# Patient Record
Sex: MALE | Race: WHITE | Employment: UNEMPLOYED | URBAN - NONMETROPOLITAN AREA
[De-identification: names, ages, dates, MRNs, and addresses within clinical notes are randomized per-mention and may not be internally consistent; named-entity substitution may affect disease eponyms.]

---

## 2021-04-05 ENCOUNTER — HOSPITAL ENCOUNTER (OUTPATIENT)
Dept: PHYSICAL THERAPY | Age: 41
Setting detail: THERAPIES SERIES
Discharge: HOME | End: 2021-04-05
Payer: COMMERCIAL

## 2021-04-05 PROCEDURE — 97162 PT EVAL MOD COMPLEX 30 MIN: CPT

## 2021-04-05 PROCEDURE — 97110 THERAPEUTIC EXERCISES: CPT

## 2021-04-05 NOTE — FLOWSHEET NOTE
walking that worsened. Patient had stents and fem bypass 5 times and continues with circulation problems. 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 Patient in rehab x 1 month at [facility] then home with wife, power chair at home from friend but no ramp in/out of house. 1st AKA prosthesis fit and received 1 month ago , has been wearing 4-6 hours per day but having problems as leg shrinking and bottoming out in socket with pain. Currently has 4 packets of 1, 3, 5 . Currently wearing full 5 ply socks on whole residual limb, cut off sock for proximal residual limb 5 ply total of 10 ply at top and 5 ply at bottom. Currently doing prone with towel roll under residual limb. Jose Saldivar 3 days ago when working on [employer] and sat back in chair and fell backward with chair. Does report not getting out of house since surgeries only 1 time in past month. Patient denies any issues with depression. Social/Functional History and Current Status:  Medications and Allergies have been reviewed and are listed on Medical History Questionnaire. Kendall Thomas lives with spouse in a multiple floor home with stairs and a handrail to enter. Currently living in old school that patient has turned into house. Patient lives on 3rd floor of house. 4 VALERIE to get into building with 1 HR, has 40 steps to get to 3rd floor apartment with B HR. Has long shower chair, shower head want, crutches, has RW, has manual w/c, has power chair from a friend but difficulty getting in/out of car so keeps on 3rd floor of apartment so doesn't use much. Has increased use /wearing of prosthesis to 4-6 hours per day, using wheelchair rest of day. Does use manual wheelchair in/out of MD appointments, to [facility].           Task Previous Current   ADLs  Independent Modified Independent uses shower chair in/out of shower, wife does help with putting long pants on /off   IADL's Independent Modified Independent Ambulation Independent Modified Independent walking limited by 100 feet from car to PT clinic   Transfers Independent Modified Independent uses handicap grab bars for bathroom, difficulty getting in/out of chair, stand pivot transfer to wheelchair to car   Recreation Independent [address] enjoys riding motorcycle , outdoor hiking, outside activity   [address] [facility]  Active    Work self employed restoration contractor- doing ladders , scaffolding, working on [employer], flashing on crosses, + lifting , currently not working, patient currently not sure if he wants to go on disability, not sure if he will be able to get back to this employment  currently not employed       OBJECTIVE:  Pain L residual limb \"end of stump\" 5-6/10  L buttock pain 10/10 \"spasms in butt\" with WB with prosthesis  LBP x 20 years since machine accident at [facility]- chiropractor x years but none x 10 years, % of the day, high 9/10, average 4/10  R LE calf pain      Palpation PT education on scar massage, cross friction massage 5 minutes, 2 x per day   Observation 18 inch incision with moderate scar tissue around incision   Posture fair        Range of Motion Hip: R hip flexion 60, abduction 15, extension   degrees. R hip flexion 70, abduction 15 degrees  Knee: R knee 0- 120 degrees with soreness at R knee flexion AROM. L knee NA  Ankle: R ankle DF 5, PF 50 degrees, L ankle NA   Strength Right Lower Extremity:  Impaired - hip and knee 4-/5, ankle 4/5  Left Lower Extremity:   Impaired - L hip 3+/5   Coordination [facility]   Sensation Impaired - L phantom pain in pain in arch of foot cramping, knife going through foot, knee twitching. Bed Mobility [facility]   Transfers [facility]   Ambulation Supervision  Distance: 100 feet  Surface: Level Tile  Device:Crutches  Gait Deviations:   Forward Flexed Posture, Slow Miguelina, Decreased Weight Shift Left, Decreased Trunk Rotation, Decreased Heel Strike on Left and Wide Base of Support   Balance Tinetti: 16/28               TREATMENT   Precautions:    Pain:     X in shaded column indicates activity completed today   Modalities Parameters/  Location  Notes                     Manual Therapy Time/Technique  Notes                     Exercise/Intervention   Notes   sidelying hip abduction R then L 10 5 x    Prone prop x 5 minutes   x    Prone SLR R then L R 5 x  L 10x 5 x    Prone pressup 10  x    Calf belt stretch 5 10 x                                                Specific Interventions Next Treatment: LE stretching, strengthening, gait training with prosthesis, stair training, balance, advanced gait    Activity/Treatment Tolerance:  []  Patient tolerated treatment well  [x]  Patient limited by fatigue  []  Patient limited by pain   []  Patient limited by medical complications  []  Other:     Assessment: PT for B LE stretching, strengthening, gait training to progress from crutches, to cane to no device and increased care/use of above the knee prosthesis. Body Structures/Functions/Activity Limitations: impaired balance, impaired ROM, impaired strength and abnormal gait  Prognosis: good    GOALS:  Patient Goal: to get back to walking without crutches    Short Term Goals:  Time Frame: 5 weeks  1. Patient to demonstrate increased AROM B hip extension to 5 degrees to allow patient to walk x 15 minutes with crutches without LOB  2. Increase strength B hip to 4/5 to allow patient to wear prosthesis x 8 to hours with no falls x 4 week  3. I with HEP as prescribed to allow patient to report able to walk in/out of therapy clinic and house with crutches and no assistance of falls with prosthesis    Long Term Goals:  Time Frame: 12 weeks  1.increase AROM B hip extension to 10 degrees , abduction 20 degrees, R knee 130 degrees to allow patient to get in/out of car and shower without assistance with prosthesis and no assistive device  2.  Increase strength B LE to 4+/5, abdominal strength to good to allow patient to tolerate walking x 30 minutes with prosthesis without assistive device with no falls  3. I with HEP as prescribed to allow patient to return getting on/off lawnmower , yard work with prosthesis x 20 minutes without rest break      Patient Education:   [x]  HEP/Education Completed: Plan of Care, Goals,  HEP of above exercises  []  Reviewed Prior HEP      []  Patient verbalized and/or demonstrated understanding of education provided. []  Patient unable to verbalize and/or demonstrate understanding of education provided. Will continue education. [x]  Barriers to learning: none    PLAN:  Treatment Recommendations: Strengthening, Range of Motion, Balance Training, Gait Training, Stair Training, Home Exercise Program and Patient Education    [x]  Plan of care initiated. Plan to see patient 2 times per week for 12 weeks to address the treatment planned outlined above.   []  Continue with current plan of care  []  Modify plan of care as follows:    []  Hold pending physician visit  []  Discharge    Time In 1300   Time Out 1400   Timed Code Minutes: 25 min   Total Treatment Time: 60 min       Electronically Signed by: Mazin Manrique PT [id number]

## 2021-04-14 ENCOUNTER — HOSPITAL ENCOUNTER (OUTPATIENT)
Dept: PHYSICAL THERAPY | Age: 41
Setting detail: THERAPIES SERIES
Discharge: HOME | End: 2021-04-14
Payer: COMMERCIAL

## 2021-04-14 PROCEDURE — 97110 THERAPEUTIC EXERCISES: CPT

## 2021-04-14 NOTE — PROGRESS NOTES
[address] [facility]  PHYSICAL THERAPY  [] EVALUATION  [x] DAILY NOTE (LAND) [] DAILY NOTE (AQUATIC ) [] PROGRESS NOTE [] DISCHARGE NOTE    [] [address] [facility]   [x] [address]    [] [facility]   [] Valerie Yanes    Date: 2021  Patient Name:  Khloe Parish  : 1980  MRN: [id number]  CSN: [id number]    Referring Practitioner Effie Gomez MD   Diagnosis LEFT AKA     Treatment Diagnosis Difficulty walking, weakness, decreased balance   Date of Evaluation 21   Additional Pertinent History Poor circulation B LE, history of chronic LBP - specialist recommended back surgery 15 years ago and no MD's since      Functional Outcome Measure Used Tinetti balance test   Functional Outcome Score  (21)       Insurance: Primary: Payor: Jairon Mejia /  /  / ,   Secondary:    Authorization Information: Precert needed. Pays at 100%, modalities covered except MHP/CP not covered. 30 visits per calendar year. Visit # 2, 2/10 for progress note   Visits Allowed: 1   Recertification Date:    Physician Follow-Up: F/u with Dr. Mick newton MD in , unsure of date. End of May f/u with [facility] surgeon for testing for fempop bypass this summer. Physician Orders: PT   History of Present Illness:      SUBJECTIVE: Pt reports 3-4/10 at end of stump, back pain 6-7/10. Pt reports feeling like he has a better understanding of putting on socks and prosthesis.     OBJECTIVE:    TREATMENT   Precautions:    Pain: 3-4/10 end of stump    X in shaded column indicates activity completed today   Modalities Parameters/  Location  Notes                     Manual Therapy Time/Technique  Notes                     Exercise/Intervention   Notes   sidelying hip abduction R then L 10 5 x L 2x5 hold 5sec, VCs for L LE    Prone prop   4 min x    Prone SLR R then L R 10 x  L 10x 5 x    Prone pressup 10 3  unable to tolerate today d/t back pn   Calf belt stretch 5 10 x    SKTC 3 20sec x           Standing weight shift fwd/bwd, S/S, 30sec ea  x CGA, no UE support   Standing balance WBOS/NBOS 1 min  x CGA, no UE support   2 inch step taps       Sit to stand x5ea  x Two bouts at different levels     Specific Interventions Next Treatment: LE stretching, strengthening, gait training with prosthesis, stair training, balance, advanced gait    Activity/Treatment Tolerance:  []  Patient tolerated treatment well  [x]  Patient limited by fatigue  [x]  Patient limited by pain   []  Patient limited by medical complications  []  Other:     Assessment: Pt limited with activity during session d/t back pain. Completed standing balance exs with minimal difficulty noted. Pt scheduled x2 visits next week. GOALS:  Patient Goal: to get back to walking without crutches    Short Term Goals:  Time Frame: 5 weeks  1. Patient to demonstrate increased AROM B hip extension to 5 degrees to allow patient to walk x 15 minutes with crutches without LOB  2. Increase strength B hip to 4/5 to allow patient to wear prosthesis x 8 to hours with no falls x 4 week  3. I with HEP as prescribed to allow patient to report able to walk in/out of therapy clinic and house with crutches and no assistance of falls with prosthesis    Long Term Goals:  Time Frame: 12 weeks  1.increase AROM B hip extension to 10 degrees , abduction 20 degrees, R knee 130 degrees to allow patient to get in/out of car and shower without assistance with prosthesis and no assistive device  2. Increase strength B LE to 4+/5, abdominal strength to good to allow patient to tolerate walking x 30 minutes with prosthesis without assistive device with no falls  3.  I with HEP as prescribed to allow patient to return getting on/off lawnmower , yard work with prosthesis x 20 minutes without rest break      Patient Education:   []  HEP/Education Completed: Plan of Care, Goals,  HEP of above exercises  [x]  Reviewed Prior HEP      [] Patient verbalized and/or demonstrated understanding of education provided. []  Patient unable to verbalize and/or demonstrate understanding of education provided. Will continue education. [x]  Barriers to learning: none    PLAN:  Treatment Recommendations: Strengthening, Range of Motion, Balance Training, Gait Training, Stair Training, Home Exercise Program and Patient Education    []  Plan of care initiated. Plan to see patient 2 times per week for 12 weeks to address the treatment planned outlined above.   [x]  Continue with current plan of care  []  Modify plan of care as follows:    []  Hold pending physician visit  []  Discharge    Time In 1105   Time Out 1148   Timed Code Minutes: 43 min   Total Treatment Time: 43 min       Electronically Signed by: Chris Bahena PTA [id number]

## 2021-04-16 ENCOUNTER — HOSPITAL ENCOUNTER (OUTPATIENT)
Dept: PHYSICAL THERAPY | Age: 41
Setting detail: THERAPIES SERIES
Discharge: HOME | End: 2021-04-16
Payer: COMMERCIAL

## 2021-04-16 PROCEDURE — 97110 THERAPEUTIC EXERCISES: CPT

## 2021-04-16 PROCEDURE — 97116 GAIT TRAINING THERAPY: CPT

## 2021-04-16 NOTE — PROGRESS NOTES
tolerate today d/t back pn   Supine R knee bent , relaxing back and getting L thigh flat on bed 2 minutes  x    Abdominal bracing 10 5 x    Quad set L 10 5 x Attempted with abdominal bracing    Abdominal bracing with UE flexion off bed 6 inches, alternating arms 10  x    Abdominal bracing with bolster under both legs SAQ on R 10  x    Gait training 2 x 100 feet with cues to bring crutches in toward body more, have both crutches equal, bring left leg forward, heel strike and push back of L thigh into socket to straighten knee as WB in socket   x    pregait exercises- at steps- B HR, toes even, shift to right, shift to left, push left thigh back into socket to straighten knee and bring R foot onto 6 inch step   x Cues to stand erect and lessen pressure on hands   Calf belt stretch 5 10     SKTC 3 20sec     Up and down 4 , 6 inch steps 2 x B HR, CGA x 1 non-reciprocal gait   x Patient demonstrated going down steps with no pressure on prosthesis, taking 3 steps at a time and jumping down   Standing weight shift fwd/bwd, S/S, 30sec ea  x CGA, no UE support   Standing balance WBOS/NBOS 1 min   CGA, no UE support   Sit to stand x5ea  x Two bouts at different levels     Specific Interventions Next Treatment: LE stretching, strengthening, gait training with prosthesis, stair training, balance, advanced gait    Activity/Treatment Tolerance:  []  Patient tolerated treatment well  [x]  Patient limited by fatigue  [x]  Patient limited by pain   []  Patient limited by medical complications  []  Other:     Assessment: long history of LBP with poor core strength . DLSP started, education on wearing of prosthesis 2, 3 hour sessions per day to reduce stress on back. Patient very anxious to get to walking without device and education on need to perfect walking with crutches prior to walking with cane or no device.   Poor knee extension at midstance and buckling of knee with walking, poor use of crutches corrected, poor gait on steps corrected, encouraged patient to think of self at start of marathon, patient wants to be at end but there are a lot of things to correct and improve and just starting with new prosthesis      GOALS:  Patient Goal: to get back to walking without crutches    Short Term Goals:  Time Frame: 5 weeks  1. Patient to demonstrate increased AROM B hip extension to 5 degrees to allow patient to walk x 15 minutes with crutches without LOB  2. Increase strength B hip to 4/5 to allow patient to wear prosthesis x 8 to hours with no falls x 4 week  3. I with HEP as prescribed to allow patient to report able to walk in/out of therapy clinic and house with crutches and no assistance of falls with prosthesis    Long Term Goals:  Time Frame: 12 weeks  1.increase AROM B hip extension to 10 degrees , abduction 20 degrees, R knee 130 degrees to allow patient to get in/out of car and shower without assistance with prosthesis and no assistive device  2. Increase strength B LE to 4+/5, abdominal strength to good to allow patient to tolerate walking x 30 minutes with prosthesis without assistive device with no falls  3. I with HEP as prescribed to allow patient to return getting on/off lawnmower , yard work with prosthesis x 20 minutes without rest break      Patient Education:   [x]  HEP/Education Completed: handout with [facility] code: [place]     [x]  Reviewed Prior HEP      []  Patient verbalized and/or demonstrated understanding of education provided. []  Patient unable to verbalize and/or demonstrate understanding of education provided. Will continue education. [x]  Barriers to learning: none    PLAN:  Treatment Recommendations: Strengthening, Range of Motion, Balance Training, Gait Training, Stair Training, Home Exercise Program and Patient Education    []  Plan of care initiated. Plan to see patient 2 times per week for 12 weeks to address the treatment planned outlined above.   [x]  Continue with current plan of care  [] Modify plan of care as follows:    []  Hold pending physician visit  []  Discharge    Time In 1245   Time Out 1330   Timed Code Minutes: 45 min   Total Treatment Time: 45 min       Electronically Signed by: Bruno Rubio PT [id number]

## 2021-04-21 ENCOUNTER — HOSPITAL ENCOUNTER (OUTPATIENT)
Dept: PHYSICAL THERAPY | Age: 41
Setting detail: THERAPIES SERIES
Discharge: HOME | End: 2021-04-21
Payer: COMMERCIAL

## 2021-04-21 PROCEDURE — 97110 THERAPEUTIC EXERCISES: CPT

## 2021-04-21 NOTE — PROGRESS NOTES
[address] [facility]  PHYSICAL THERAPY  [] EVALUATION  [x] DAILY NOTE (LAND) [] DAILY NOTE (AQUATIC ) [] PROGRESS NOTE [] DISCHARGE NOTE    [] [address] [facility]   [x] [address]    [] [facility]   [] Arianna Weber    Date: 2021  Patient Name:  Michele Hutson  : 1980  MRN: [id number]  CSN: [id number]    Referring Practitioner Mely Lynn MD   Diagnosis LEFT AKA     Treatment Diagnosis Difficulty walking, weakness, decreased balance   Date of Evaluation 21   Additional Pertinent History Poor circulation B LE, history of chronic LBP - specialist recommended back surgery 15 years ago and no MD's since      Functional Outcome Measure Used Tinetti balance test   Functional Outcome Score  (21)       Insurance: Primary: Payor: Jetty Councilman /  /  / ,   Secondary:    Authorization Information: Precert needed. Pays at 100%, modalities covered except MHP/CP not covered. 30 visits per calendar year. Visit # 4, 4/10 for progress note   Visits Allowed: 1   Recertification Date: 6796   Physician Follow-Up: F/u with Dr. Ponce Iglesias family MD in , unsure of date. End of May f/u with [facility] surgeon for testing for fempop bypass this summer. Physician Orders: PT   History of Present Illness:      SUBJECTIVE: Pt ambulating with improved technique since last session. Reports that LBP is 6/10. Reports wearing prosthesis 3 hours on and 3 hours off x2 for a total of 6 hours a day. Patient reports that the prosthesis is fitting better, 13 at top and 5 at the bottom with PT instructing pt to call when top gets to 15. Reports he is getting an offset knee added to his prosthesis to assist with gait mechanics. Compliant with HEP x2 a day.     OBJECTIVE:    TREATMENT   Precautions:    Pain: 6-7/10 LBP, phantom foot /calfpain 3/10    X in shaded column indicates activity completed today   Modalities Parameters/  Location  Notes Manual Therapy Time/Technique  Notes                     Exercise/Intervention   Notes   sidelying hip abduction R then L 10 5 x L 2x5 hold 5sec, VCs for L LE    Prone prop   4 min x    Prone SLR R then L R 10 x  L 10x 5  5 x    Prone pressup 10 3  unable to tolerate today d/t back pn   Supine R knee bent , relaxing back and getting L thigh flat on bed 2 minutes  x Pt able to keep RLE flat on table   Abdominal bracing 10 5 x    Quad set L, R knee bent 15 5 x with abdominal bracing    Abdominal bracing with UE flexion off bed 6 inches, alternating arms 10 5 x    Abdominal bracing with bolster under both legs SAQ on R 15 5 x    Abdominal bracing with mini bridge- use of bolster   x Attempted, unable to d/t pain in back   Gait training 2 x 100 feet with cues to bring crutches in toward body more, have both crutches equal, bring left leg forward, heel strike and push back of L thigh into socket to straighten knee as WB in socket   x In clinic, improved heel strike and knee extension noted   pregait exercises- at steps- B HR, toes even, shift to right, shift to left, push left thigh back into socket to straighten knee and bring R foot onto 6 inch step  2x5 step taps ea LE x Cues to stand erect and lessen pressure on hands   Calf belt stretch 5 10     SKTC 3 20sec     Up and down 4 , 6 inch steps 2 x B HR, CGA x 1 non-reciprocal gait    Patient demonstrated going down steps with no pressure on prosthesis, taking 3 steps at a time and jumping down   Standing weight shift fwd/bwd, S/S, 15sec ea  x CGA, no UE support   Standing balance WBOS/NBOS [id number] ea  x CGA, no UE support   Sit to stand x5ea   Two bouts at different levels     Specific Interventions Next Treatment: LE stretching, strengthening, gait training with prosthesis, stair training, balance, advanced gait    Activity/Treatment Tolerance:  []  Patient tolerated treatment well  []  Patient limited by fatigue  [x]  Patient limited by pain   [] Patient limited by medical complications  []  Other:     Assessment: Pt tolerating session fairly well, LBP continues to limit pt tolerance with standing upright, along with other strengthening exs Noted improved gait and locking out R knee. GOALS:  Patient Goal: to get back to walking without crutches    Short Term Goals:  Time Frame: 5 weeks  1. Patient to demonstrate increased AROM B hip extension to 5 degrees to allow patient to walk x 15 minutes with crutches without LOB  2. Increase strength B hip to 4/5 to allow patient to wear prosthesis x 8 to hours with no falls x 4 week  3. I with HEP as prescribed to allow patient to report able to walk in/out of therapy clinic and house with crutches and no assistance of falls with prosthesis    Long Term Goals:  Time Frame: 12 weeks  1.increase AROM B hip extension to 10 degrees , abduction 20 degrees, R knee 130 degrees to allow patient to get in/out of car and shower without assistance with prosthesis and no assistive device  2. Increase strength B LE to 4+/5, abdominal strength to good to allow patient to tolerate walking x 30 minutes with prosthesis without assistive device with no falls  3. I with HEP as prescribed to allow patient to return getting on/off lawnmower , yard work with prosthesis x 20 minutes without rest break      Patient Education:   [x]  HEP/Education Completed: handout with [facility] code: Maurice [place]     [x]  Reviewed Prior HEP      []  Patient verbalized and/or demonstrated understanding of education provided. []  Patient unable to verbalize and/or demonstrate understanding of education provided. Will continue education. [x]  Barriers to learning: none    PLAN:  Treatment Recommendations: Strengthening, Range of Motion, Balance Training, Gait Training, Stair Training, Home Exercise Program and Patient Education    []  Plan of care initiated.   Plan to see patient 2 times per week for 12 weeks to address the treatment planned outlined above.  [x]  Continue with current plan of care  []  Modify plan of care as follows:    []  Hold pending physician visit  []  Discharge    Time In 1100   Time Out 1145   Timed Code Minutes: 45 min   Total Treatment Time: 45 min       Electronically Signed by: Milton Power PTA [id number]

## 2021-04-23 ENCOUNTER — HOSPITAL ENCOUNTER (OUTPATIENT)
Dept: PHYSICAL THERAPY | Age: 41
Setting detail: THERAPIES SERIES
Discharge: HOME | End: 2021-04-23
Payer: COMMERCIAL

## 2021-04-23 PROCEDURE — 97116 GAIT TRAINING THERAPY: CPT

## 2021-04-23 PROCEDURE — 97110 THERAPEUTIC EXERCISES: CPT

## 2021-04-23 NOTE — PROGRESS NOTES
Pain: 7/10 LBP, phantom foot /calfpain 3/10    X in shaded column indicates activity completed today   Modalities Parameters/  Location  Notes                     Manual Therapy Time/Technique  Notes                     Exercise/Intervention   Notes   sidelying hip extension R then L 10 5 x      Prone prop   4 min x    Prone SLR R then L R 10 x  L 10x 5  5  Unable due to LBP   Prone pressup 10  x    Supine R knee bent , relaxing back and getting L thigh flat on bed 3 minutes  x Pt able to keep RLE flat on table   Abdominal bracing 15 5 x    Quad set L, R knee bent 15 5 x with abdominal bracing    Abdominal bracing with UE flexion off bed 6 inches, alternating arms 10 5 x    Abdominal bracing with bolster under both legs SAQ on R 15 5 x    Abdominal bracing with mini bridge- use of bolster    Attempted, unable to d/t pain in back   Gait training 2 x 100 feet with cues to bring crutches in toward body more, have both crutches equal, bring left leg forward, heel strike and push back of L thigh into socket to straighten knee as WB in socket   x In clinic, improved heel strike and knee extension noted   pregait exercises- at steps- B HR, toes even, shift to right, shift to left, push left thigh back into socket to straighten knee and bring R foot onto 6 inch step  2x5 step taps ea LE x Cues to stand erect and lessen pressure on hands   Calf belt stretch 5 10     SKTC 3 20sec     Up and down 4 , 6 inch steps 2 x B HR, CGA x 1 non-reciprocal gait    Patient demonstrated going down steps with no pressure on prosthesis, taking 3 steps at a time and jumping down   Standing weight shift fwd/bwd, S/S, 15sec ea  x CGA, no UE support   Standing balance WBOS/NBOS [id number] ea  x CGA, no UE support   Sit to stand x5ea   Two bouts at different levels     Specific Interventions Next Treatment: LE stretching, strengthening, gait training with prosthesis, stair training, balance, advanced gait    Activity/Treatment Tolerance:  [] Training, Home Exercise Program and Patient Education    []  Plan of care initiated. Plan to see patient 2 times per week for 12 weeks to address the treatment planned outlined above.   [x]  Continue with current plan of care  []  Modify plan of care as follows:    []  Hold pending physician visit  []  Discharge    Time In 1100   Time Out 1145   Timed Code Minutes: 45 min   Total Treatment Time: 45 min       Electronically Signed by: Andrew Bradley PT [id number]

## 2021-04-28 ENCOUNTER — APPOINTMENT (OUTPATIENT)
Dept: PHYSICAL THERAPY | Age: 41
End: 2021-04-28
Payer: COMMERCIAL

## 2021-04-30 ENCOUNTER — HOSPITAL ENCOUNTER (OUTPATIENT)
Dept: PHYSICAL THERAPY | Age: 41
Setting detail: THERAPIES SERIES
Discharge: HOME | End: 2021-04-30
Payer: COMMERCIAL

## 2021-04-30 PROCEDURE — 97116 GAIT TRAINING THERAPY: CPT

## 2021-04-30 PROCEDURE — 97110 THERAPEUTIC EXERCISES: CPT

## 2021-04-30 NOTE — PROGRESS NOTES
[address] [facility]  PHYSICAL THERAPY  [x] DAILY NOTE (LAND) [] DAILY NOTE (AQUATIC ) [] PROGRESS NOTE [] DISCHARGE NOTE    [] [address] [facility]   [x] Martha [address]    [] [facility]   [] Rodney Castañeda    Date: 2021  Patient Name:  Nelson Ricardo  : 1980  MRN: [id number]  CSN: [id number]    Referring Practitioner Kaycee Acosta MD   Diagnosis LEFT AKA     Treatment Diagnosis Difficulty walking, weakness, decreased balance   Date of Evaluation 21   Additional Pertinent History Poor circulation B LE, history of chronic LBP - specialist recommended back surgery 15 years ago and no MD's since      Functional Outcome Measure Used Tinetti balance test   Functional Outcome Score  (21)       Insurance: Primary: Payor: Afsaneh Mishran /  /  / ,   Secondary:    Authorization Information: Precert needed. Pays at 100%, modalities covered except MHP/CP not covered. 30 visits per calendar year. Visit # 5, 5/10 for progress note   Visits Allowed: 1   Recertification Date:    Physician Follow-Up: F/u with Dr. Pat newton MD in , unsure of date. End of May f/u with [facility] surgeon for testing for fempop bypass this summer. Physician Orders: PT   History of Present Illness:      SUBJECTIVE: Patient reporting low back pain 4/10 and stump pain 4/10. Patient reporting got knee offset by 15 degrees and reporting that getting it offset has helped his back. Patient reporting he is also getting a new socket.   OBJECTIVE:    TREATMENT   Precautions: L AKA, fall risk   Pain: 4/10 LBP, stump pain 4/10    X in shaded column indicates activity completed today   Modalities Parameters/  Location  Notes         Manual Therapy Time/Technique  Notes         Exercise/Intervention   Notes   side lying hip extension R then L 10 5 x      Prone prop   4 min x    Prone SLR R then L R 10 x  L 10x 5  5 x    Prone press up 10  x    Supine R knee but denies increase in stump pain. No other complains reported at end of session. GOALS:  Patient Goal: to get back to walking without crutches    Short Term Goals:  Time Frame: 5 weeks  1. Patient to demonstrate increased AROM B hip extension to 5 degrees to allow patient to walk x 15 minutes with crutches without LOB  2. Increase strength B hip to 4/5 to allow patient to wear prosthesis x 8 to hours with no falls x 4 week  3. I with HEP as prescribed to allow patient to report able to walk in/out of therapy clinic and house with crutches and no assistance of falls with prosthesis    Long Term Goals:  Time Frame: 12 weeks  1.increase AROM B hip extension to 10 degrees , abduction 20 degrees, R knee 130 degrees to allow patient to get in/out of car and shower without assistance with prosthesis and no assistive device  2. Increase strength B LE to 4+/5, abdominal strength to good to allow patient to tolerate walking x 30 minutes with prosthesis without assistive device with no falls  3. I with HEP as prescribed to allow patient to return getting on/off lawnmower , yard work with prosthesis x 20 minutes without rest break      Patient Education:   [x]  HEP/Education Completed:  Increased wearing of prosthesis to 4-5 hours at a time, 1 hour in between. [x]  Reviewed Prior HEP      []  Patient verbalized and/or demonstrated understanding of education provided. []  Patient unable to verbalize and/or demonstrate understanding of education provided. Will continue education. [x]  Barriers to learning: none    PLAN:2 times per week for 12 weeks.   Treatment Recommendations: Strengthening, Range of Motion, Balance Training, Gait Training, Stair Training, Home Exercise Program and Patient Education    [x]  Continue with current plan of care  []  Modify plan of care as follows:    []  Hold pending physician visit  []  Discharge    Time In 1101   Time Out 1143   Timed Code Minutes: 42 min   Total Treatment Time: 42 min Electronically Signed by: Heidi Blancas

## 2021-05-05 ENCOUNTER — HOSPITAL ENCOUNTER (OUTPATIENT)
Dept: PHYSICAL THERAPY | Age: 41
Setting detail: THERAPIES SERIES
Discharge: HOME | End: 2021-05-05
Payer: COMMERCIAL

## 2021-05-05 PROCEDURE — 97116 GAIT TRAINING THERAPY: CPT

## 2021-05-05 PROCEDURE — 97110 THERAPEUTIC EXERCISES: CPT

## 2021-05-05 NOTE — PROGRESS NOTES
[address] [facility]  PHYSICAL THERAPY  [x] DAILY NOTE (LAND) [] DAILY NOTE (AQUATIC ) [] PROGRESS NOTE [] DISCHARGE NOTE    [] [address] [facility]   [x] Martha     [] [facility]   [] Parviz Maxwell    Date: 2021  Patient Name:  Angie Lockett  : 1980  MRN: [id number]  CSN: [id number]    Referring Practitioner Tu Santos MD   Diagnosis LEFT AKA     Treatment Diagnosis Difficulty walking, weakness, decreased balance   Date of Evaluation 21   Additional Pertinent History Poor circulation B LE, history of chronic LBP - specialist recommended back surgery 15 years ago and no MD's since      Functional Outcome Measure Used Tinetti balance test   Functional Outcome Score  (21)       Insurance: Primary: Payor: Harpal Bejarano /  /  / ,   Secondary:    Authorization Information: Precert needed. Pays at 100%, modalities covered except MHP/CP not covered. 30 visits per calendar year. Visit # 7, 7/10 for progress note   Visits Allowed: 1   Recertification Date:    Physician Follow-Up: F/u with Dr. Raoul newton MD in , unsure of date. End of May f/u with [facility] surgeon for testing for fempop bypass this summer. Physician Orders: PT   History of Present Illness:      SUBJECTIVE:  Patient reports wearing prosthesis 15 hours each day Saturday, . Has small \"rub\" on front of groin on L, , small band aid over sore.     OBJECTIVE:    TREATMENT   Precautions: L AKA, fall risk   Pain: 4/10 LBP, stump pain 4/10    X in shaded column indicates activity completed today   Modalities Parameters/  Location  Notes         Manual Therapy Time/Technique  Notes         Exercise/Intervention   Notes   side lying hip extension R then L 10 5 x      Prone prop   4 min x    Prone SLR R then L R 15 x  L 15x 5  5 x    Prone press up 10  x    Supine R knee bent , relaxing back and getting L leg hang/drop off bed  3 minutes  x Pt able to keep RLE flat on table   Abdominal bracing 20 5 x    Quad set L, R knee bent 15 5 x with abdominal bracing    Abdominal bracing with UE flexion off bed 6 inches, alternating arms 15 5 x    Abdominal bracing with bolster under both legs SAQ on R 15 5 x    Abdominal bracing with mini bridge- use of bolster    Attempted, unable to d/t pain in back   Gait training 4 x 100 feet with cues to bring crutches in toward body more, have both crutches equal, bring left leg forward, heel strike and push back of L thigh into socket to straighten knee as WB in socket   x In clinic, improved heel strike and knee extension noted   pregait exercises- at steps- B HR, toes even, shift to right, shift to left, push left thigh back into socket to straighten knee and bring R foot onto 6 inch step  2x5 step taps ea LE  Cues to stand erect and lessen pressure on hands   Calf belt stretch 5 10     SKTC 3 20sec     Up and down 4 , 6 inch steps 2 x B HR, CGA x 1 non-reciprocal gait    Patient demonstrated going down steps with no pressure on prosthesis, taking 3 steps at a time and jumping down   Standing weight shift fwd/bwd, S/S, 20 ea  x CGA, no UE support   Standing balance WBOS/NBOS [id number] ea  x CGA, no UE support   Small step stance  20 sec x 2 each  x CGA, no UE support   Sit to stand x5ea   Two bouts at different levels     Specific Interventions Next Treatment: LE stretching, strengthening, gait training with prosthesis, stair training, balance, advanced gait    Activity/Treatment Tolerance:  []  Patient tolerated treatment well  []  Patient limited by fatigue  [x]  Patient limited by pain   []  Patient limited by medical complications  []  Other:     Assessment: PT notes 1 cm inner thigh open area where skin had rubbed off but not an open wound. Instructed to wear prosthesis only 3 x , 2 hours max, monitor size of sore and if increased size or drainage from wound , need to stop wearing prosthesis all together.   Poor fit of prosthesis with 16 ply socks, awaiting new socket  GOALS:  Patient Goal: to get back to walking without crutches    Short Term Goals:  Time Frame: 5 weeks  1. Patient to demonstrate increased AROM B hip extension to 5 degrees to allow patient to walk x 15 minutes with crutches without LOB  2. Increase strength B hip to 4/5 to allow patient to wear prosthesis x 8 to hours with no falls x 4 week  3. I with HEP as prescribed to allow patient to report able to walk in/out of therapy clinic and house with crutches and no assistance of falls with prosthesis    Long Term Goals:  Time Frame: 12 weeks  1.increase AROM B hip extension to 10 degrees , abduction 20 degrees, R knee 130 degrees to allow patient to get in/out of car and shower without assistance with prosthesis and no assistive device  2. Increase strength B LE to 4+/5, abdominal strength to good to allow patient to tolerate walking x 30 minutes with prosthesis without assistive device with no falls  3. I with HEP as prescribed to allow patient to return getting on/off lawnmower , yard work with prosthesis x 20 minutes without rest break      Patient Education:   [x]  HEP/Education Completed:  Decreased . []  Reviewed Prior HEP      []  Patient verbalized and/or demonstrated understanding of education provided. []  Patient unable to verbalize and/or demonstrate understanding of education provided. Will continue education. [x]  Barriers to learning: none    PLAN:2 times per week for 12 weeks.   Treatment Recommendations: Strengthening, Range of Motion, Balance Training, Gait Training, Stair Training, Home Exercise Program and Patient Education    [x]  Continue with current plan of care  []  Modify plan of care as follows:    []  Hold pending physician visit  []  Discharge    Time In 1100   Time Out 1145   Timed Code Minutes: 45 min   Total Treatment Time: 45 min       Electronically Signed by: Carlos Naqvi

## 2021-05-07 ENCOUNTER — HOSPITAL ENCOUNTER (OUTPATIENT)
Dept: PHYSICAL THERAPY | Age: 41
Setting detail: THERAPIES SERIES
Discharge: HOME | End: 2021-05-07
Payer: COMMERCIAL

## 2021-05-07 PROCEDURE — 97110 THERAPEUTIC EXERCISES: CPT

## 2021-05-07 PROCEDURE — 97116 GAIT TRAINING THERAPY: CPT

## 2021-05-07 NOTE — PROGRESS NOTES
[address] [facility]  PHYSICAL THERAPY  [x] DAILY NOTE (LAND) [] DAILY NOTE (AQUATIC ) [] PROGRESS NOTE [] DISCHARGE NOTE    [] [address] [facility]   [x] [address]    [] [facility]   [] Gus Bedoya    Date: 2021  Patient Name:  Rennis Peabody  : 1980  MRN: [id number]  CSN: [id number]    Referring Practitioner Koki Reagan MD   Diagnosis LEFT AKA     Treatment Diagnosis Difficulty walking, weakness, decreased balance   Date of Evaluation 21   Additional Pertinent History Poor circulation B LE, history of chronic LBP - specialist recommended back surgery 15 years ago and no MD's since      Functional Outcome Measure Used Tinetti balance test   Functional Outcome Score  (21)       Insurance: Primary: Payor: Dorice Favre /  /  / ,   Secondary:    Authorization Information: Precert needed. Pays at 100%, modalities covered except MHP/CP not covered. 30 visits per calendar year. Visit # 8, 8/10 for progress note   Visits Allowed: 1   Recertification Date:    Physician Follow-Up: F/u with Dr. Caroline newton MD in , unsure of date. End of May f/u with [facility] surgeon for testing for fempop bypass this summer. Physician Orders: PT   History of Present Illness:      SUBJECTIVE:  Patient reports sore is no worse today and reporting no new complains at this time.     OBJECTIVE:    TREATMENT   Precautions: L AKA, fall risk   Pain: 4/10 LBP, stump pain 4/10    X in shaded column indicates activity completed today   Modalities Parameters/  Location  Notes         Manual Therapy Time/Technique  Notes         Exercise/Intervention   Notes   side lying hip extension R then L 15 5 x      Prone prop   4 min x    Prone SLR R then L R 15 x  L 15x 5  5 x    Prone press up 10  x    Supine R knee bent , relaxing back and getting L leg hang/drop off bed  3 minutes  x Pt able to keep RLE flat on table   Abdominal bracing 20 5 x    Quad set L, R knee bent 15 5 x with abdominal bracing    Abdominal bracing with UE flexion off bed 6 inches, alternating arms 20 5 x    Abdominal bracing with bolster under both legs SAQ on R 15 5 x    Abdominal bracing with mini bridge- use of bolster    Attempted, unable to d/t pain in back   Gait training 4 x 100 feet with cues to bring crutches in toward body more, have both crutches equal, bring left leg forward, heel strike and push back of L thigh into socket to straighten knee as WB in socket   x In clinic, improved heel strike and knee extension noted   pregait exercises- at steps- B HR, toes even, shift to right, shift to left, push left thigh back into socket to straighten knee and bring R foot onto 6 inch step  2x5 step taps ea LE  Cues to stand erect and lessen pressure on hands   Calf belt stretch 5 10     SKTC 3 20sec     Up and down 4 , 6 inch steps 2 x B HR, CGA x 1 non-reciprocal gait    Patient demonstrated going down steps with no pressure on prosthesis, taking 3 steps at a time and jumping down   Standing weight shift fwd/bwd, S/S, 20 ea   CGA, no UE support   Standing balance WBOS/NBOS [id number] ea  x CGA, no UE support. Just NBOS today   Small step stance  1 min each  x CGA, no UE support   Sit to stand x5ea   Two bouts at different levels     Specific Interventions Next Treatment: LE stretching, strengthening, gait training with prosthesis, stair training, balance, advanced gait    Activity/Treatment Tolerance:  []  Patient tolerated treatment well  []  Patient limited by fatigue  [x]  Patient limited by pain   []  Patient limited by medical complications  []  Other:     Assessment: Progressed with reps of a few exercises as noted with good toelrnace from patient. Patient reporting slight increase in pain at end of session but having no other complains. GOALS:  Patient Goal: to get back to walking without crutches    Short Term Goals:  Time Frame: 5 weeks  1.  Patient to demonstrate increased AROM B hip extension to 5 degrees to allow patient to walk x 15 minutes with crutches without LOB  2. Increase strength B hip to 4/5 to allow patient to wear prosthesis x 8 to hours with no falls x 4 week  3. I with HEP as prescribed to allow patient to report able to walk in/out of therapy clinic and house with crutches and no assistance of falls with prosthesis    Long Term Goals:  Time Frame: 12 weeks  1.increase AROM B hip extension to 10 degrees , abduction 20 degrees, R knee 130 degrees to allow patient to get in/out of car and shower without assistance with prosthesis and no assistive device  2. Increase strength B LE to 4+/5, abdominal strength to good to allow patient to tolerate walking x 30 minutes with prosthesis without assistive device with no falls  3. I with HEP as prescribed to allow patient to return getting on/off lawnmower , yard work with prosthesis x 20 minutes without rest break      Patient Education:   [x]  HEP/Education Completed:    []  Reviewed Prior HEP      []  Patient verbalized and/or demonstrated understanding of education provided. []  Patient unable to verbalize and/or demonstrate understanding of education provided. Will continue education. [x]  Barriers to learning: none    PLAN:2 times per week for 12 weeks.   Treatment Recommendations: Strengthening, Range of Motion, Balance Training, Gait Training, Stair Training, Home Exercise Program and Patient Education    [x]  Continue with current plan of care  []  Modify plan of care as follows:    []  Hold pending physician visit  []  Discharge    Time In 1139   Time Out 1219   Timed Code Minutes: 40 min   Total Treatment Time: 40 min       Electronically Signed by: Vanessa Schaffer

## 2021-05-12 ENCOUNTER — HOSPITAL ENCOUNTER (OUTPATIENT)
Dept: PHYSICAL THERAPY | Age: 41
Setting detail: THERAPIES SERIES
Discharge: HOME | End: 2021-05-12
Payer: COMMERCIAL

## 2021-05-12 PROCEDURE — 97110 THERAPEUTIC EXERCISES: CPT

## 2021-05-12 NOTE — PROGRESS NOTES
[address] [facility]  PHYSICAL THERAPY  [x] DAILY NOTE (LAND) [] DAILY NOTE (AQUATIC ) [] PROGRESS NOTE [] DISCHARGE NOTE    [] [address] [facility]   [x] Martha [address]    [] [facility]   [] Dmitriy Toney    Date: 2021  Patient Name:  Allen Garcia  : 1980  MRN: [id number]  CSN: [id number]    Referring Practitioner Evgeny Vázquez MD   Diagnosis LEFT AKA     Treatment Diagnosis Difficulty walking, weakness, decreased balance   Date of Evaluation 21   Additional Pertinent History Poor circulation B LE, history of chronic LBP - specialist recommended back surgery 15 years ago and no MD's since      Functional Outcome Measure Used Tinetti balance test   Functional Outcome Score  (21)       Insurance: Primary: Payor: Jensen Green /  /  / ,   Secondary:    Authorization Information: Precert needed. Pays at 100%, modalities covered except MHP/CP not covered. 30 visits per calendar year. Approved for 144 units through 21 but total 30 visits per calendar year   Visit # 9, 9/10 for progress note   Visits Allowed: 144 units approved, 15 minutes each through 4102   Recertification Date: 3/3/0070   Physician Follow-Up: F/u with Dr. Rose Mayes family MD in , unsure of date. End of May f/u with [facility] surgeon for testing for fempop bypass this summer.    Physician Orders: PT   History of Present Illness:      SUBJECTIVE:  Reports up to 18 ply sock and socket not fitting, awaiting call to get new socket, not in yet, dime sized sore slightly larger, still open   OBJECTIVE:    TREATMENT   Precautions: L AKA, fall risk   Pain: 4/10 LBP, stump pain 4/10    X in shaded column indicates activity completed today   Modalities Parameters/  Location  Notes         Manual Therapy Time/Technique  Notes         Exercise/Intervention   Notes   side lying hip extension R then L 15 5 x      Prone prop   4 min x    Prone SLR R then L R 15 x  L 15x 5  5 x    Prone press up 10  x    Supine R knee bent , relaxing back and getting L leg hang/drop off bed  3 minutes  x Pt able to keep RLE flat on table   Abdominal bracing 20 5 x    Quad set L, R knee bent 15 5 x with abdominal bracing    Abdominal bracing with UE flexion off bed 6 inches, alternating arms 20 5 x    Abdominal bracing with bolster under both legs SAQ on R 15 5 x    Abdominal bracing with mini bridge- use of bolster    Attempted, unable to d/t pain in back   Gait training 4 x 100 feet with cues to bring crutches in toward body more, have both crutches equal, bring left leg forward, heel strike and push back of L thigh into socket to straighten knee as WB in socket    In clinic, improved heel strike and knee extension noted   pregait exercises- at steps- B HR, toes even, shift to right, shift to left, push left thigh back into socket to straighten knee and bring R foot onto 6 inch step  2x5 step taps ea LE  Cues to stand erect and lessen pressure on hands   Calf belt stretch 5 10     SKTC 3 20sec     Up and down 4 , 6 inch steps 2 x B HR, CGA x 1 non-reciprocal gait    Patient demonstrated going down steps with no pressure on prosthesis, taking 3 steps at a time and jumping down   Standing weight shift fwd/bwd, S/S, 20 ea   CGA, no UE support   Standing balance WBOS/NBOS [id number] ea  x CGA, no UE support.  Just NBOS today   Small step stance  1 min each  x CGA, no UE support   Sit to stand x5ea   Two bouts at different levels     Specific Interventions Next Treatment: LE stretching, strengthening, gait training with prosthesis, stair training, balance, advanced gait    Activity/Treatment Tolerance:  []  Patient tolerated treatment well  []  Patient limited by fatigue  [x]  Patient limited by pain   []  Patient limited by medical complications  []  Other:     Assessment:  PT advised to hold on wearing prosthesis until Sunday to allow open wound to heal, then 2 hours on Sunday, may need to look

## 2021-05-14 ENCOUNTER — APPOINTMENT (OUTPATIENT)
Dept: PHYSICAL THERAPY | Age: 41
End: 2021-05-14
Payer: COMMERCIAL

## 2021-05-19 ENCOUNTER — APPOINTMENT (OUTPATIENT)
Dept: PHYSICAL THERAPY | Age: 41
End: 2021-05-19
Payer: COMMERCIAL

## 2021-05-21 ENCOUNTER — HOSPITAL ENCOUNTER (OUTPATIENT)
Dept: PHYSICAL THERAPY | Age: 41
Setting detail: THERAPIES SERIES
End: 2021-05-21
Payer: COMMERCIAL

## 2021-05-28 ENCOUNTER — HOSPITAL ENCOUNTER (OUTPATIENT)
Dept: PHYSICAL THERAPY | Age: 41
Setting detail: THERAPIES SERIES
End: 2021-05-28
Payer: COMMERCIAL

## 2021-06-23 ENCOUNTER — HOSPITAL ENCOUNTER (OUTPATIENT)
Dept: PHYSICAL THERAPY | Age: 41
Setting detail: THERAPIES SERIES
Discharge: HOME | End: 2021-06-23
Payer: COMMERCIAL

## 2024-10-28 ENCOUNTER — TELEPHONE (OUTPATIENT)
Dept: WOUND CARE | Age: 44
End: 2024-10-28

## 2024-10-28 NOTE — TELEPHONE ENCOUNTER
Referral made for wound clinic from [facility]. Patient currently admitted and will need outpatient wound care to groin. Patient states he'll call  to set up appt once DC.

## 2024-11-01 ENCOUNTER — HOSPITAL ENCOUNTER (OUTPATIENT)
Dept: WOUND CARE | Age: 44
Discharge: HOME | End: 2024-11-02
Payer: MEDICAID

## 2024-11-01 VITALS
SYSTOLIC BLOOD PRESSURE: 122 MMHG | DIASTOLIC BLOOD PRESSURE: 80 MMHG | HEART RATE: 70 BPM | HEIGHT: 70 IN | RESPIRATION RATE: 18 BRPM | TEMPERATURE: 98.8 F

## 2024-11-01 DIAGNOSIS — T81.31XD POSTOPERATIVE WOUND DEHISCENCE, SUBSEQUENT ENCOUNTER: Primary | ICD-10-CM

## 2024-11-01 PROBLEM — T81.31XA WOUND DEHISCENCE, SURGICAL: Status: ACTIVE | Noted: 2024-11-01

## 2024-11-01 PROBLEM — S31.109A RIGHT GROIN WOUND: Status: ACTIVE | Noted: 2024-11-01

## 2024-11-01 PROCEDURE — 11042 DBRDMT SUBQ TIS 1ST 20SQCM/<: CPT

## 2024-11-01 PROCEDURE — 97606 NEG PRS WND THER DME>50 SQCM: CPT

## 2024-11-01 RX ORDER — LIDOCAINE HYDROCHLORIDE 40 MG/ML
SOLUTION TOPICAL ONCE
OUTPATIENT
Start: 2024-11-01 | End: 2024-11-01

## 2024-11-01 RX ORDER — ASPIRIN 81 MG/1
TABLET, CHEWABLE ORAL
COMMUNITY

## 2024-11-01 RX ORDER — ALBUTEROL SULFATE 90 UG/1
2 INHALANT RESPIRATORY (INHALATION) EVERY 4 HOURS PRN
COMMUNITY

## 2024-11-01 RX ORDER — CLOBETASOL PROPIONATE 0.5 MG/G
OINTMENT TOPICAL ONCE
OUTPATIENT
Start: 2024-11-01 | End: 2024-11-01

## 2024-11-01 RX ORDER — LIDOCAINE 50 MG/G
OINTMENT TOPICAL ONCE
OUTPATIENT
Start: 2024-11-01 | End: 2024-11-01

## 2024-11-01 RX ORDER — GENTAMICIN SULFATE 1 MG/G
OINTMENT TOPICAL ONCE
OUTPATIENT
Start: 2024-11-01 | End: 2024-11-01

## 2024-11-01 RX ORDER — LIDOCAINE HYDROCHLORIDE 20 MG/ML
JELLY TOPICAL ONCE
OUTPATIENT
Start: 2024-11-01 | End: 2024-11-01

## 2024-11-01 RX ORDER — LIDOCAINE 40 MG/G
CREAM TOPICAL ONCE
OUTPATIENT
Start: 2024-11-01 | End: 2024-11-01

## 2024-11-01 RX ORDER — SILVER SULFADIAZINE 10 MG/G
CREAM TOPICAL ONCE
OUTPATIENT
Start: 2024-11-01 | End: 2024-11-01

## 2024-11-01 RX ORDER — MUPIROCIN 20 MG/G
OINTMENT TOPICAL ONCE
OUTPATIENT
Start: 2024-11-01 | End: 2024-11-01

## 2024-11-01 RX ORDER — METHOCARBAMOL 500 MG/1
TABLET, FILM COATED ORAL
COMMUNITY

## 2024-11-01 RX ORDER — TRIAMCINOLONE ACETONIDE 1 MG/G
OINTMENT TOPICAL ONCE
OUTPATIENT
Start: 2024-11-01 | End: 2024-11-01

## 2024-11-01 RX ORDER — SODIUM CHLOR/HYPOCHLOROUS ACID 0.033 %
SOLUTION, IRRIGATION IRRIGATION ONCE
OUTPATIENT
Start: 2024-11-01 | End: 2024-11-01

## 2024-11-01 RX ORDER — NEOMYCIN/BACITRACIN/POLYMYXINB 3.5-400-5K
OINTMENT (GRAM) TOPICAL ONCE
OUTPATIENT
Start: 2024-11-01 | End: 2024-11-01

## 2024-11-01 RX ORDER — BETAMETHASONE DIPROPIONATE 0.5 MG/G
CREAM TOPICAL ONCE
OUTPATIENT
Start: 2024-11-01 | End: 2024-11-01

## 2024-11-01 RX ORDER — BACITRACIN ZINC AND POLYMYXIN B SULFATE 500; 1000 [USP'U]/G; [USP'U]/G
OINTMENT TOPICAL ONCE
OUTPATIENT
Start: 2024-11-01 | End: 2024-11-01

## 2024-11-01 RX ORDER — GINSENG 100 MG
CAPSULE ORAL ONCE
OUTPATIENT
Start: 2024-11-01 | End: 2024-11-01

## 2024-11-01 RX ORDER — AMLODIPINE BESYLATE 10 MG/1
10 TABLET ORAL DAILY
COMMUNITY

## 2024-11-01 RX ORDER — WARFARIN SODIUM 10 MG/1
10 TABLET ORAL
COMMUNITY

## 2024-11-01 RX ORDER — ACETAMINOPHEN 500 MG
1000 TABLET ORAL EVERY 8 HOURS PRN
COMMUNITY
Start: 2024-10-30

## 2024-11-01 RX ORDER — CARVEDILOL 3.12 MG/1
TABLET ORAL
COMMUNITY

## 2024-11-01 ASSESSMENT — PAIN SCALES - GENERAL: PAINLEVEL_OUTOF10: 2

## 2024-11-01 NOTE — PROGRESS NOTES
Negative Pressure Wound Therapy    NAME:  Jaziel Gonzales  YOB: 1980  MEDICAL RECORD NUMBER:  [id number]  DATE:  11/1/2024    Applied Negative Pressure to right groin wound(s)/ulcer(s).  [x] Applied skin barrier prep to kay-wound.   [x] Cut strips of plastic drape to picture frame wound so that kay-wound is     covered with the drape.   [x] If bridging dressing to less prominent site, cover any intact skin that will come in contact with the Negative Pressure Therapy sponge, gauze or channel drain with plastic drape. The sponge should never touch intact skin.   [x] Cut sponge, gauze or channel drain to size which will fit into the wound/ulcer bed without being forced.   [x] Be sure the sponge is large enough to hold the entire round plastic flange which is attached to the tubing. Never allow flange to be larger than the sponge or it will produce suction damaging intact skin.  Total number of individual pieces of foam used within the wound bed: 1 black foam     [x] If bridging the dressing away from the primary site, be sure the bridge leads to a piece of sponge large enough to hold the entire flange without allowing any of the flange to overlap onto intact skin.   [x] Covered sponge, gauze or channel drain with plastic drape.   [x] Cut a hole in this plastic drape directly over the sponge the same size as the plastic drain tubing.   [x] Removed plastic liner from flange and apply it directly over the hole you cut.   [x] Removed the plastic cover from the flange.   [x] Attached the tubing to the wound/ulcer Negative Pressure Therapy and turn it on to be sure a vacuum is created and that there are no leaks.   [x] If air leaks occur, use plastic drape to patch them.   [x] Secured Negative Pressure Therapy dressing with ace wrap loosely if located on an extremity. Maintain tubing outside of ace wrap. Tubing must not exert pressure on intact skin.    Applied per  Guidelines      Electronically

## 2024-11-01 NOTE — PROGRESS NOTES
Insurance Verification Request      Ordering Center:     [facility]  [address]  [place] [address]  [phone]  Wound Center Phone Number [phone]  FAX: [phone]  Nelda Mendez RN  Facility NPI: [id number]    Facility Tax ID [id number]    Provider Information:     Provider's Name Benjamín Potts, PAC   Provider's NPI: [id number]  Provider's Address [address] [facility] [address]    Patient Information:     Albert Skinner  [address] [place] [address]   [phone]   : 1980  AGE: 43 y.o.     GENDER: male   TODAYS DATE:  2024    Insurance:     PRIMARY INSURANCE:  Plan: [facility] MEDICAID  Coverage: [facility] MEDICAID  Effective Date: 2023  Group Number: [unfilled]  Subscriber Number: [id number] - (Medicaid Managed)    Payer/Plan Subscr  Sex Relation Sub. Ins. ID Effective Group Num   1. [facility] MED* ALBERT SKINNER 1980 Male Self [id number]                                   [address] [address]       Application/product Information:     Benefit Verification Request    Prior Authorization Required: No    Reason for Request: New Wound    [employer] FAX# [phone]    Trunk/Arms/Legs [address]60[address] (1st 25 sq cm)    Affinity, Apligraft, NuShield, and Puraply AM    Has patient been treated with any other Skin Substitute on this Wound:     No    If yes, How many previous applications:  none    WOUND #: 1    Total Square CM: 21    Procedure setting: Hospital Outpatient Department    Is the Patient currently in a skilled nursing facility: No     Is the wound work related: No    Procedure date: 24    Patient Information:     Dx Codes: T81.31XA    Problem List Items Addressed This Visit          Other    Wound dehiscence, surgical - Primary      Wound looks very good and clean today, does have healthy beefy red granulation tissue present, some overlying slough, no necrotic tissue appreciated there is no surrounding signs of

## 2024-11-01 NOTE — ASSESSMENT & PLAN NOTE
Wound looks very good and clean today, does have healthy beefy red granulation tissue present, some overlying slough, no necrotic tissue appreciated there is no surrounding signs of infection, been tolerating wound VAC well, at this point we will continue this wound VAC, get him set up for home health and have him continue following with vascular which she is scheduled to see next Friday.  I will see him back in 1 to 2 weeks for further monitoring and debridement as necessary.

## 2024-11-01 NOTE — PROGRESS NOTES
[facility]   Progress Note and Procedure Note      Jaziel Gonazles  MEDICAL RECORD NUMBER:  [id number]  AGE: 43 y.o.   GENDER: male  : 1980  EPISODE DATE:  2024    Subjective:     Chief Complaint   Patient presents with    Wound Check     Groin          HISTORY of PRESENT ILLNESS HPI     Jaziel Gonzales is a 43 year old male with PMH HTN, HLP, HIT (on Coumadin), PAD with an extensive complex vascular surgical history with an eventual L AKA in , aortobifem bypass in , who had acute aortic occlusion and Magaly IIB ALI s/p R ax-fem bypass with RLE popliteal and tibial thrombectomies & RLE 4 compartment fasciotomies on 10/7/24 at [facility]. Was discharged on 10/14/2024 and returned d/t superficial dehiscence of his right groin incision and subsequently underwent sharp excisional debridement of right groin incision with placement of negative pressure wound vac   Wound measures 9 cm x 3.5 cm x 2 cm after debridement on 10/23/2024.    2024: First day of establishing care with our wound care team, reports he is tolerating the wound vac well, no issues at home, no worsening pain, no surrounding erythema or swelling, denies f/c, no new odors or discharge.    Current Dressing:  Wound vac    PAST MEDICAL HISTORY        Diagnosis Date    Asthma     CAD (coronary artery disease)        PAST SURGICAL HISTORY    Past Surgical History:   Procedure Laterality Date    AMPUTATION      AORTIC INNOMINATE ARTERY BYPASS      FINGER AMPUTATION         FAMILY HISTORY    No family history on file.    SOCIAL HISTORY    Social History     Tobacco Use    Smoking status: Every Day     Current packs/day: 1.00     Types: Cigarettes   Substance Use Topics    Alcohol use: No    Drug use: No       ALLERGIES    Allergies   Allergen Reactions    Heparin      Makes blood clot        MEDICATIONS    Current Outpatient Medications on File Prior to Encounter   Medication Sig Dispense

## 2024-11-01 NOTE — DISCHARGE INSTRUCTIONS
Start wound vac therapy to right groin wound, chagne dressing 2x/week in clinic. Follow up as scheduled    SIGNS OF INFECTION  - Redness, swelling, skin hot  - Wound bed turns black or stringy yellow  - Foul odor  - Increased drainage or pus  - Increased pain  - Fever greater than 100F    CALL YOUR DOCTOR OR SEEK MEDICAL ATTENTION IF SIGNS OF INFECTION.  DO NOT WAIT UNTIL YOUR NEXT APPOINTMENT    Call the Wound Care Nurse with any other questions or concerns- [phone]

## 2024-11-05 ENCOUNTER — HOSPITAL ENCOUNTER (OUTPATIENT)
Dept: WOUND CARE | Age: 44
Discharge: HOME | End: 2024-11-06
Payer: MEDICAID

## 2024-11-05 VITALS
TEMPERATURE: 97.8 F | HEART RATE: 72 BPM | RESPIRATION RATE: 20 BRPM | SYSTOLIC BLOOD PRESSURE: 110 MMHG | DIASTOLIC BLOOD PRESSURE: 72 MMHG | HEIGHT: 70 IN

## 2024-11-05 DIAGNOSIS — T81.31XD POSTOPERATIVE WOUND DEHISCENCE, SUBSEQUENT ENCOUNTER: Primary | ICD-10-CM

## 2024-11-05 PROCEDURE — 99213 OFFICE O/P EST LOW 20 MIN: CPT

## 2024-11-05 PROCEDURE — 97606 NEG PRS WND THER DME>50 SQCM: CPT

## 2024-11-05 RX ORDER — NEOMYCIN/BACITRACIN/POLYMYXINB 3.5-400-5K
OINTMENT (GRAM) TOPICAL ONCE
OUTPATIENT
Start: 2024-11-05 | End: 2024-11-05

## 2024-11-05 RX ORDER — LIDOCAINE HYDROCHLORIDE 20 MG/ML
JELLY TOPICAL ONCE
OUTPATIENT
Start: 2024-11-05 | End: 2024-11-05

## 2024-11-05 RX ORDER — BETAMETHASONE DIPROPIONATE 0.5 MG/G
CREAM TOPICAL ONCE
OUTPATIENT
Start: 2024-11-05 | End: 2024-11-05

## 2024-11-05 RX ORDER — LIDOCAINE HYDROCHLORIDE 40 MG/ML
SOLUTION TOPICAL ONCE
OUTPATIENT
Start: 2024-11-05 | End: 2024-11-05

## 2024-11-05 RX ORDER — TRIAMCINOLONE ACETONIDE 1 MG/G
OINTMENT TOPICAL ONCE
OUTPATIENT
Start: 2024-11-05 | End: 2024-11-05

## 2024-11-05 RX ORDER — CLOBETASOL PROPIONATE 0.5 MG/G
OINTMENT TOPICAL ONCE
OUTPATIENT
Start: 2024-11-05 | End: 2024-11-05

## 2024-11-05 RX ORDER — GINSENG 100 MG
CAPSULE ORAL ONCE
OUTPATIENT
Start: 2024-11-05 | End: 2024-11-05

## 2024-11-05 RX ORDER — MUPIROCIN 20 MG/G
OINTMENT TOPICAL ONCE
OUTPATIENT
Start: 2024-11-05 | End: 2024-11-05

## 2024-11-05 RX ORDER — SODIUM CHLOR/HYPOCHLOROUS ACID 0.033 %
SOLUTION, IRRIGATION IRRIGATION ONCE
OUTPATIENT
Start: 2024-11-05 | End: 2024-11-05

## 2024-11-05 RX ORDER — SILVER SULFADIAZINE 10 MG/G
CREAM TOPICAL ONCE
OUTPATIENT
Start: 2024-11-05 | End: 2024-11-05

## 2024-11-05 RX ORDER — LIDOCAINE 50 MG/G
OINTMENT TOPICAL ONCE
OUTPATIENT
Start: 2024-11-05 | End: 2024-11-05

## 2024-11-05 RX ORDER — BACITRACIN ZINC AND POLYMYXIN B SULFATE 500; 1000 [USP'U]/G; [USP'U]/G
OINTMENT TOPICAL ONCE
OUTPATIENT
Start: 2024-11-05 | End: 2024-11-05

## 2024-11-05 RX ORDER — GENTAMICIN SULFATE 1 MG/G
OINTMENT TOPICAL ONCE
OUTPATIENT
Start: 2024-11-05 | End: 2024-11-05

## 2024-11-05 RX ORDER — LIDOCAINE 40 MG/G
CREAM TOPICAL ONCE
OUTPATIENT
Start: 2024-11-05 | End: 2024-11-05

## 2024-11-05 ASSESSMENT — PAIN SCALES - GENERAL: PAINLEVEL_OUTOF10: 3

## 2024-11-05 NOTE — PLAN OF CARE
Problem: Cognitive:  Goal: Knowledge of wound care  Description: Knowledge of wound care  Outcome: Progressing  Goal: Understands risk factors for wounds  Description: Understands risk factors for wounds  Outcome: Progressing     Problem: Wound:  Goal: Will show signs of wound healing; wound closure and no evidence of infection  Description: Will show signs of wound healing; wound closure and no evidence of infection  Outcome: Progressing     Problem: Venous:  Goal: Signs of wound healing will improve  Description: Signs of wound healing will improve  Outcome: Progressing     Problem: Smoking cessation:  Goal: Ability to formulate a plan to maintain a tobacco-free life will be supported  Description: Ability to formulate a plan to maintain a tobacco-free life will be supported  Outcome: Progressing     Problem: Weight control:  Goal: Ability to maintain an optimal weight for height and age will be supported  Description: Ability to maintain an optimal weight for height and age will be supported  Outcome: Progressing     Problem: Falls - Risk of:  Goal: Will remain free from falls  Description: Will remain free from falls  Outcome: Progressing

## 2024-11-05 NOTE — PROGRESS NOTES
gauze or channel drain to size which will fit into the wound/ulcer bed without being forced.   [x] Be sure the sponge is large enough to hold the entire round plastic flange which is attached to the tubing. Never allow flange to be larger than the sponge or it will produce suction damaging intact skin.  Total number of individual pieces of foam used within the wound bed: 1 black foam     [x] If bridging the dressing away from the primary site, be sure the bridge leads to a piece of sponge large enough to hold the entire flange without allowing any of the flange to overlap onto intact skin.   [x] Covered sponge, gauze or channel drain with plastic drape.   [x] Cut a hole in this plastic drape directly over the sponge the same size as the plastic drain tubing.   [x] Removed plastic liner from flange and apply it directly over the hole you cut.   [x] Removed the plastic cover from the flange.   [x] Attached the tubing to the wound/ulcer Negative Pressure Therapy and turn it on to be sure a vacuum is created and that there are no leaks.   [x] If air leaks occur, use plastic drape to patch them.   [x] Secured Negative Pressure Therapy dressing with ace wrap loosely if located on an extremity. Maintain tubing outside of ace wrap. Tubing must not exert pressure on intact skin.    Applied per  Guidelines      Electronically signed by Nelda Mendez RN on 11/5/2024 at 11:51 AM

## 2024-11-12 ENCOUNTER — HOSPITAL ENCOUNTER (OUTPATIENT)
Dept: WOUND CARE | Age: 44
Discharge: HOME | End: 2024-11-13
Payer: MEDICAID

## 2024-11-12 DIAGNOSIS — T81.31XD POSTOPERATIVE WOUND DEHISCENCE, SUBSEQUENT ENCOUNTER: Primary | ICD-10-CM

## 2024-11-12 PROCEDURE — 99213 OFFICE O/P EST LOW 20 MIN: CPT

## 2024-11-12 RX ORDER — CLOBETASOL PROPIONATE 0.5 MG/G
OINTMENT TOPICAL ONCE
OUTPATIENT
Start: 2024-11-12 | End: 2024-11-12

## 2024-11-12 RX ORDER — LIDOCAINE HYDROCHLORIDE 20 MG/ML
JELLY TOPICAL ONCE
OUTPATIENT
Start: 2024-11-12 | End: 2024-11-12

## 2024-11-12 RX ORDER — MUPIROCIN 20 MG/G
OINTMENT TOPICAL ONCE
OUTPATIENT
Start: 2024-11-12 | End: 2024-11-12

## 2024-11-12 RX ORDER — LIDOCAINE HYDROCHLORIDE 40 MG/ML
SOLUTION TOPICAL ONCE
OUTPATIENT
Start: 2024-11-12 | End: 2024-11-12

## 2024-11-12 RX ORDER — LIDOCAINE 40 MG/G
CREAM TOPICAL ONCE
OUTPATIENT
Start: 2024-11-12 | End: 2024-11-12

## 2024-11-12 RX ORDER — NEOMYCIN/BACITRACIN/POLYMYXINB 3.5-400-5K
OINTMENT (GRAM) TOPICAL ONCE
OUTPATIENT
Start: 2024-11-12 | End: 2024-11-12

## 2024-11-12 RX ORDER — SODIUM CHLOR/HYPOCHLOROUS ACID 0.033 %
SOLUTION, IRRIGATION IRRIGATION ONCE
OUTPATIENT
Start: 2024-11-12 | End: 2024-11-12

## 2024-11-12 RX ORDER — LIDOCAINE 50 MG/G
OINTMENT TOPICAL ONCE
OUTPATIENT
Start: 2024-11-12 | End: 2024-11-12

## 2024-11-12 RX ORDER — GENTAMICIN SULFATE 1 MG/G
OINTMENT TOPICAL ONCE
OUTPATIENT
Start: 2024-11-12 | End: 2024-11-12

## 2024-11-12 RX ORDER — SILVER SULFADIAZINE 10 MG/G
CREAM TOPICAL ONCE
OUTPATIENT
Start: 2024-11-12 | End: 2024-11-12

## 2024-11-12 RX ORDER — TRIAMCINOLONE ACETONIDE 1 MG/G
OINTMENT TOPICAL ONCE
OUTPATIENT
Start: 2024-11-12 | End: 2024-11-12

## 2024-11-12 RX ORDER — GINSENG 100 MG
CAPSULE ORAL ONCE
OUTPATIENT
Start: 2024-11-12 | End: 2024-11-12

## 2024-11-12 RX ORDER — BETAMETHASONE DIPROPIONATE 0.5 MG/G
CREAM TOPICAL ONCE
OUTPATIENT
Start: 2024-11-12 | End: 2024-11-12

## 2024-11-12 RX ORDER — BACITRACIN ZINC AND POLYMYXIN B SULFATE 500; 1000 [USP'U]/G; [USP'U]/G
OINTMENT TOPICAL ONCE
OUTPATIENT
Start: 2024-11-12 | End: 2024-11-12

## 2024-11-12 NOTE — PLAN OF CARE
Problem: Cognitive:  Goal: Knowledge of wound care  Description: Knowledge of wound care  Outcome: Progressing  Goal: Understands risk factors for wounds  Description: Understands risk factors for wounds  Outcome: Progressing     Problem: Wound:  Goal: Will show signs of wound healing; wound closure and no evidence of infection  Description: Will show signs of wound healing; wound closure and no evidence of infection  Outcome: Progressing     Problem: Smoking cessation:  Goal: Ability to formulate a plan to maintain a tobacco-free life will be supported  Description: Ability to formulate a plan to maintain a tobacco-free life will be supported  Outcome: Progressing     Problem: Weight control:  Goal: Ability to maintain an optimal weight for height and age will be supported  Description: Ability to maintain an optimal weight for height and age will be supported  Outcome: Progressing     Problem: Falls - Risk of:  Goal: Will remain free from falls  Description: Will remain free from falls  Outcome: Progressing

## 2024-11-12 NOTE — DISCHARGE INSTRUCTIONS
Discontinue previous dressing. Apply collagen to your wound bed every day. Cut or tear the material to fit the wound and lay it on the wound. Moisten with normal saline until it turns gel-like unless you have heavy drainage. Cover with dry gauze and hold in place with tape or roll gauze. Change your dressing whenever drainage comes through the outer dressing even if it is sooner than the time prescribed.       SIGNS OF INFECTION  - Redness, swelling, skin hot  - Wound bed turns black or stringy yellow  - Foul odor  - Increased drainage or pus  - Increased pain  - Fever greater than 100F    CALL YOUR DOCTOR OR SEEK MEDICAL ATTENTION IF SIGNS OF INFECTION.  DO NOT WAIT UNTIL YOUR NEXT APPOINTMENT    Call the Wound Care Nurse with any other questions or concerns- [phone]    Follow up as scheduled

## 2024-11-12 NOTE — PROGRESS NOTES
Patient presented for dressing change per order of Benjamín Potts PA-C. Old dressing removed. Periwound and ulcer(s) cleansed with normal saline. Patient had vascular appt at [facility] 11/8/24, their office discharged wound vac but said to f/u with [facility]. At this time, patient wishes to hold wound vac until next appt with [facility].   New dressing (collagen and border foam)  applied to right groin. Patient was educated on proper dressing changed and voiced understanding. Patient tolerated dressing change well.   Wound Care Documentation:  Wound 11/01/24 Femoral Proximal;Right right groin (Active)   Wound Image   11/12/24 1056   Wound Etiology Surgical 11/12/24 1056   Dressing Status Old drainage noted 11/12/24 1056   Wound Cleansed Cleansed with saline 11/12/24 1056   Dressing/Treatment Collagen 11/12/24 1056   Offloading for Diabetic Foot Ulcers Offloading not ordered 11/12/24 1056   Dressing Change Due 11/14/24 11/12/24 1056   Wound Length (cm) 5.9 cm 11/12/24 1056   Wound Width (cm) 2.5 cm 11/12/24 1056   Wound Depth (cm) 0.2 cm 11/12/24 1056   Wound Surface Area (cm^2) 14.75 cm^2 11/12/24 1056   Change in Wound Size % (l*w) 29.76 11/12/24 1056   Wound Volume (cm^3) 2.95 cm^3 11/12/24 1056   Wound Healing % 80 11/12/24 1056   Wound Assessment Pink/red 11/12/24 1056   Drainage Amount Moderate (25-50%) 11/12/24 1056   Drainage Description Serosanguinous 11/12/24 1056   Odor None 11/12/24 1056   Mary Jane-wound Assessment Intact 11/12/24 1056   Margins Undefined edges 11/12/24 1056   Wound Thickness Description not for Pressure Injury Full thickness 11/12/24 1056   Number of days: 11      Right groin

## 2024-11-19 ENCOUNTER — HOSPITAL ENCOUNTER (OUTPATIENT)
Dept: WOUND CARE | Age: 44
Discharge: HOME | End: 2024-11-20
Payer: MEDICAID

## 2024-11-19 VITALS
SYSTOLIC BLOOD PRESSURE: 118 MMHG | TEMPERATURE: 98.5 F | HEIGHT: 70 IN | HEART RATE: 75 BPM | RESPIRATION RATE: 18 BRPM | DIASTOLIC BLOOD PRESSURE: 68 MMHG

## 2024-11-19 DIAGNOSIS — T81.31XD POSTOPERATIVE WOUND DEHISCENCE, SUBSEQUENT ENCOUNTER: Primary | ICD-10-CM

## 2024-11-19 PROCEDURE — 11042 DBRDMT SUBQ TIS 1ST 20SQCM/<: CPT

## 2024-11-19 PROCEDURE — 11042 DBRDMT SUBQ TIS 1ST 20SQCM/<: CPT | Performed by: PHYSICIAN ASSISTANT

## 2024-11-19 RX ORDER — MUPIROCIN 20 MG/G
OINTMENT TOPICAL ONCE
OUTPATIENT
Start: 2024-11-19 | End: 2024-11-19

## 2024-11-19 RX ORDER — LIDOCAINE 50 MG/G
OINTMENT TOPICAL ONCE
OUTPATIENT
Start: 2024-11-19 | End: 2024-11-19

## 2024-11-19 RX ORDER — NEOMYCIN/BACITRACIN/POLYMYXINB 3.5-400-5K
OINTMENT (GRAM) TOPICAL ONCE
OUTPATIENT
Start: 2024-11-19 | End: 2024-11-19

## 2024-11-19 RX ORDER — LIDOCAINE HYDROCHLORIDE 40 MG/ML
SOLUTION TOPICAL ONCE
OUTPATIENT
Start: 2024-11-19 | End: 2024-11-19

## 2024-11-19 RX ORDER — LIDOCAINE 40 MG/G
CREAM TOPICAL ONCE
OUTPATIENT
Start: 2024-11-19 | End: 2024-11-19

## 2024-11-19 RX ORDER — TRIAMCINOLONE ACETONIDE 1 MG/G
OINTMENT TOPICAL ONCE
OUTPATIENT
Start: 2024-11-19 | End: 2024-11-19

## 2024-11-19 RX ORDER — GENTAMICIN SULFATE 1 MG/G
OINTMENT TOPICAL ONCE
OUTPATIENT
Start: 2024-11-19 | End: 2024-11-19

## 2024-11-19 RX ORDER — GINSENG 100 MG
CAPSULE ORAL ONCE
OUTPATIENT
Start: 2024-11-19 | End: 2024-11-19

## 2024-11-19 RX ORDER — LIDOCAINE HYDROCHLORIDE 20 MG/ML
JELLY TOPICAL ONCE
OUTPATIENT
Start: 2024-11-19 | End: 2024-11-19

## 2024-11-19 RX ORDER — SILVER SULFADIAZINE 10 MG/G
CREAM TOPICAL ONCE
OUTPATIENT
Start: 2024-11-19 | End: 2024-11-19

## 2024-11-19 RX ORDER — CLOBETASOL PROPIONATE 0.5 MG/G
OINTMENT TOPICAL ONCE
OUTPATIENT
Start: 2024-11-19 | End: 2024-11-19

## 2024-11-19 RX ORDER — BETAMETHASONE DIPROPIONATE 0.5 MG/G
CREAM TOPICAL ONCE
OUTPATIENT
Start: 2024-11-19 | End: 2024-11-19

## 2024-11-19 RX ORDER — BACITRACIN ZINC AND POLYMYXIN B SULFATE 500; 1000 [USP'U]/G; [USP'U]/G
OINTMENT TOPICAL ONCE
OUTPATIENT
Start: 2024-11-19 | End: 2024-11-19

## 2024-11-19 RX ORDER — SODIUM CHLOR/HYPOCHLOROUS ACID 0.033 %
SOLUTION, IRRIGATION IRRIGATION ONCE
OUTPATIENT
Start: 2024-11-19 | End: 2024-11-19

## 2024-11-19 NOTE — ASSESSMENT & PLAN NOTE
Wound is much smaller and filling in very quickly, had some hypergranulation tissue that was protruding past the skin line over almost the entire wound, was debrided off was very nice healthy strong tissue, there is no signs of any underlying infection or surrounding issues, at this point we will not need any further wound VAC, we will continue with the collagen and have him follow back in 1 week.  Continue follow-up with vascular as directed.

## 2024-11-19 NOTE — PLAN OF CARE
Problem: Cognitive:  Goal: Knowledge of wound care  Description: Knowledge of wound care  Outcome: Progressing  Goal: Understands risk factors for wounds  Description: Understands risk factors for wounds  Outcome: Progressing     Problem: Wound:  Goal: Will show signs of wound healing; wound closure and no evidence of infection  Description: Will show signs of wound healing; wound closure and no evidence of infection  Outcome: Progressing     Problem: Venous:  Goal: Signs of wound healing will improve  Description: Signs of wound healing will improve  Outcome: Progressing     Problem: Smoking cessation:  Goal: Ability to formulate a plan to maintain a tobacco-free life will be supported  Description: Ability to formulate a plan to maintain a tobacco-free life will be supported  Outcome: Progressing     Problem: Weight control:  Goal: Ability to maintain an optimal weight for height and age will be supported  Description: Ability to maintain an optimal weight for height and age will be supported  Outcome: Progressing     Problem: Falls - Risk of:  Goal: Will remain free from falls  Description: Will remain free from falls  Outcome: Progressing     Problem: Blood Glucose:  Goal: Ability to maintain appropriate glucose levels will improve  Description: Ability to maintain appropriate glucose levels will improve  Outcome: Progressing

## 2024-11-19 NOTE — PROGRESS NOTES
[facility]   Progress Note and Procedure Note      Jaziel Gonzales  MEDICAL RECORD NUMBER:  [id number]  AGE: 43 y.o.   GENDER: male  : 1980  EPISODE DATE:  2024    Subjective:     Chief Complaint   Patient presents with    Wound Check     Right groin         HISTORY of PRESENT ILLNESS HPI     Jaziel Gonzales is a 43 year old male with PMH HTN, HLP, HIT (on Coumadin), PAD with an extensive complex vascular surgical history with an eventual L AKA in , aortobifem bypass in , who had acute aortic occlusion and Ada IIB ALI s/p R ax-fem bypass with RLE popliteal and tibial thrombectomies & RLE 4 compartment fasciotomies on 10/7/24 at [facility]. Was discharged on 10/14/2024 and returned d/t superficial dehiscence of his right groin incision and subsequently underwent sharp excisional debridement of right groin incision with placement of negative pressure wound vac   Wound measures 9 cm x 3.5 cm x 2 cm after debridement on 10/23/2024.    2024: First day of establishing care with our wound care team, reports he is tolerating the wound vac well, no issues at home, no worsening pain, no surrounding erythema or swelling, denies f/c, no new odors or discharge.    2024: Pt states is doing well, no new discharge or pain, no new odor, no issues with dressing changes, denies f/c, is healing well and much smaller.    Current Dressing:  collagen    PAST MEDICAL HISTORY        Diagnosis Date    Asthma     CAD (coronary artery disease)        PAST SURGICAL HISTORY    Past Surgical History:   Procedure Laterality Date    AMPUTATION      AORTIC INNOMINATE ARTERY BYPASS      FINGER AMPUTATION         FAMILY HISTORY    History reviewed. No pertinent family history.    SOCIAL HISTORY    Social History     Tobacco Use    Smoking status: Every Day     Current packs/day: 1.00     Types: Cigarettes   Substance Use Topics    Alcohol use: No    Drug use: No

## 2024-11-19 NOTE — DISCHARGE INSTRUCTIONS
Continue collagen dressing to right groin wound, Cover with dry dressing, change dressing daily. Ok to cleanse with soap and water, pat dry.  SIGNS OF INFECTION  - Redness, swelling, skin hot  - Wound bed turns black or stringy yellow  - Foul odor  - Increased drainage or pus  - Increased pain  - Fever greater than 100F    CALL YOUR DOCTOR OR SEEK MEDICAL ATTENTION IF SIGNS OF INFECTION.  DO NOT WAIT UNTIL YOUR NEXT APPOINTMENT    Call the Wound Care Nurse with any other questions or concerns- [phone]  Follow up as scheduled